# Patient Record
Sex: MALE | Race: WHITE
[De-identification: names, ages, dates, MRNs, and addresses within clinical notes are randomized per-mention and may not be internally consistent; named-entity substitution may affect disease eponyms.]

---

## 2018-11-20 ENCOUNTER — HOSPITAL ENCOUNTER (EMERGENCY)
Dept: HOSPITAL 62 - ER | Age: 63
LOS: 2 days | Discharge: PSYCHIATRIC OR SUBSTANCE USE FACILITY | End: 2018-11-22
Payer: MEDICARE

## 2018-11-20 DIAGNOSIS — I25.2: ICD-10-CM

## 2018-11-20 DIAGNOSIS — F43.10: ICD-10-CM

## 2018-11-20 DIAGNOSIS — Z90.49: ICD-10-CM

## 2018-11-20 DIAGNOSIS — Z95.1: ICD-10-CM

## 2018-11-20 DIAGNOSIS — F17.200: ICD-10-CM

## 2018-11-20 DIAGNOSIS — F99: Primary | ICD-10-CM

## 2018-11-20 LAB
ADD MANUAL DIFF: NO
ALBUMIN SERPL-MCNC: 4 G/DL (ref 3.5–5)
ALP SERPL-CCNC: 107 U/L (ref 38–126)
ALT SERPL-CCNC: 61 U/L (ref 21–72)
ANION GAP SERPL CALC-SCNC: 11 MMOL/L (ref 5–19)
APAP SERPL-MCNC: < 10 UG/ML (ref 10–30)
APPEARANCE UR: CLEAR
APTT PPP: YELLOW S
AST SERPL-CCNC: 63 U/L (ref 17–59)
BARBITURATES UR QL SCN: NEGATIVE
BASOPHILS # BLD AUTO: 0 10^3/UL (ref 0–0.2)
BASOPHILS NFR BLD AUTO: 0.4 % (ref 0–2)
BILIRUB DIRECT SERPL-MCNC: 0.4 MG/DL (ref 0–0.4)
BILIRUB SERPL-MCNC: 0.9 MG/DL (ref 0.2–1.3)
BILIRUB UR QL STRIP: NEGATIVE
BUN SERPL-MCNC: 10 MG/DL (ref 7–20)
CALCIUM: 9.3 MG/DL (ref 8.4–10.2)
CHLORIDE SERPL-SCNC: 98 MMOL/L (ref 98–107)
CO2 SERPL-SCNC: 28 MMOL/L (ref 22–30)
EOSINOPHIL # BLD AUTO: 0.1 10^3/UL (ref 0–0.6)
EOSINOPHIL NFR BLD AUTO: 0.7 % (ref 0–6)
ERYTHROCYTE [DISTWIDTH] IN BLOOD BY AUTOMATED COUNT: 14.7 % (ref 11.5–14)
ETHANOL SERPL-MCNC: < 10 MG/DL
GLUCOSE SERPL-MCNC: 119 MG/DL (ref 75–110)
GLUCOSE UR STRIP-MCNC: NEGATIVE MG/DL
HCT VFR BLD CALC: 49.8 % (ref 37.9–51)
HGB BLD-MCNC: 17.4 G/DL (ref 13.5–17)
KETONES UR STRIP-MCNC: (no result) MG/DL
LYMPHOCYTES # BLD AUTO: 2 10^3/UL (ref 0.5–4.7)
LYMPHOCYTES NFR BLD AUTO: 21.9 % (ref 13–45)
MCH RBC QN AUTO: 31.7 PG (ref 27–33.4)
MCHC RBC AUTO-ENTMCNC: 34.9 G/DL (ref 32–36)
MCV RBC AUTO: 91 FL (ref 80–97)
METHADONE UR QL SCN: NEGATIVE
MONOCYTES # BLD AUTO: 0.6 10^3/UL (ref 0.1–1.4)
MONOCYTES NFR BLD AUTO: 6.7 % (ref 3–13)
NEUTROPHILS # BLD AUTO: 6.5 10^3/UL (ref 1.7–8.2)
NEUTS SEG NFR BLD AUTO: 70.3 % (ref 42–78)
NITRITE UR QL STRIP: NEGATIVE
PCP UR QL SCN: NEGATIVE
PH UR STRIP: 6 [PH] (ref 5–9)
PLATELET # BLD: 216 10^3/UL (ref 150–450)
POTASSIUM SERPL-SCNC: 4.6 MMOL/L (ref 3.6–5)
PROT SERPL-MCNC: 6.8 G/DL (ref 6.3–8.2)
PROT UR STRIP-MCNC: NEGATIVE MG/DL
RBC # BLD AUTO: 5.49 10^6/UL (ref 4.35–5.55)
SALICYLATES SERPL-MCNC: < 1 MG/DL (ref 2–20)
SODIUM SERPL-SCNC: 137.2 MMOL/L (ref 137–145)
SP GR UR STRIP: 1.01
TOTAL CELLS COUNTED % (AUTO): 100 %
URINE AMPHETAMINES SCREEN: NEGATIVE
URINE BENZODIAZEPINES SCREEN: NEGATIVE
URINE COCAINE SCREEN: NEGATIVE
URINE MARIJUANA (THC) SCREEN: NEGATIVE
UROBILINOGEN UR-MCNC: NEGATIVE MG/DL (ref ?–2)
WBC # BLD AUTO: 9.2 10^3/UL (ref 4–10.5)

## 2018-11-20 PROCEDURE — 96372 THER/PROPH/DIAG INJ SC/IM: CPT

## 2018-11-20 PROCEDURE — 99285 EMERGENCY DEPT VISIT HI MDM: CPT

## 2018-11-20 PROCEDURE — 81001 URINALYSIS AUTO W/SCOPE: CPT

## 2018-11-20 PROCEDURE — 36415 COLL VENOUS BLD VENIPUNCTURE: CPT

## 2018-11-20 PROCEDURE — 80053 COMPREHEN METABOLIC PANEL: CPT

## 2018-11-20 PROCEDURE — 93005 ELECTROCARDIOGRAM TRACING: CPT

## 2018-11-20 PROCEDURE — 93971 EXTREMITY STUDY: CPT

## 2018-11-20 PROCEDURE — 80307 DRUG TEST PRSMV CHEM ANLYZR: CPT

## 2018-11-20 PROCEDURE — 93010 ELECTROCARDIOGRAM REPORT: CPT

## 2018-11-20 PROCEDURE — 85025 COMPLETE CBC W/AUTO DIFF WBC: CPT

## 2018-11-20 RX ADMIN — BUSPIRONE HYDROCHLORIDE SCH MG: 10 TABLET ORAL at 17:52

## 2018-11-20 RX ADMIN — OLANZAPINE SCH MG: 5 TABLET, FILM COATED ORAL at 17:52

## 2018-11-20 RX ADMIN — DIVALPROEX SODIUM SCH MG: 500 TABLET, FILM COATED, EXTENDED RELEASE ORAL at 17:52

## 2018-11-20 RX ADMIN — ASPIRIN SCH: 81 TABLET, CHEWABLE ORAL at 15:12

## 2018-11-20 NOTE — RADIOLOGY REPORT (SQ)
EXAM DESCRIPTION:  VENOUS UNILATERAL LOWER



COMPLETED DATE/TIME:  11/20/2018 6:20 pm



REASON FOR STUDY:  Swelling and pain right leg, donor site for CABG



COMPARISON:  None.



TECHNIQUE:  Dynamic and static gray scale and color images acquired of the right leg venous system. S
elected spectral images acquired with additional compression and augmentation maneuvers. The contrala
teral common femoral vein and saphenofemoral junction were also imaged. Images stored on PACS.



LIMITATIONS:  None.



FINDINGS:  COMMON FEMORAL: Normal phasicity, compression and augmentation. No visualized echogenic ma
terial on gray scale. No defects on color images.

FEMORAL: Normal compression and augmentation. No visualized echogenic material on gray scale. No defe
cts on color images.

POPLITEAL: Normal compression, augmentation. No visualized echogenic material on gray scale. No defec
ts on color images.

CALF VESSELS: Normal compression, augmentation. No visualized echogenic material on gray scale. No de
fects on color images.

GSV and SSV: The greater saphenous vein is surgically absent status post graft harvest.

ANY DEEP VENOUS INSUFFICIENCY: Not evaluated.

ANY EVIDENCE OF POPLITEAL CYST: No.

OTHER: No other significant finding.

CONTRALATERAL COMMON FEMORAL VEIN AND SAPHENOFEMORAL JUNCTION:

Normal phasicity, compression and augmentation. No visualized echogenic material on gray scale. No de
fects on color images.



IMPRESSION:  Negative ultrasound examination for deep venous thrombosis in the right lower extremity.




TECHNICAL DOCUMENTATION:  JOB ID:  [id number]

 2011 [employer]- All Rights Reserved



Reading location - IP/workstation name: [email or web address]
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
## 2018-11-20 NOTE — EKG REPORT
SEVERITY:- ABNORMAL ECG -

SINUS RHYTHM

LEFT ATRIAL ABNORMALITY

NONSPECIFIC INTRAVENTRICULAR CONDUCTION DELAY

INFERIOR INFARCT, AGE INDETERMINATE

:

Confirmed by: Kiarra Matta MD 20-Nov-2018 15:48:53

## 2018-11-21 RX ADMIN — OLANZAPINE SCH MG: 5 TABLET, FILM COATED ORAL at 10:44

## 2018-11-21 RX ADMIN — ASPIRIN SCH MG: 81 TABLET, CHEWABLE ORAL at 10:44

## 2018-11-21 RX ADMIN — BUSPIRONE HYDROCHLORIDE SCH MG: 10 TABLET ORAL at 18:55

## 2018-11-21 RX ADMIN — BUSPIRONE HYDROCHLORIDE SCH MG: 10 TABLET ORAL at 10:44

## 2018-11-21 RX ADMIN — DIVALPROEX SODIUM SCH MG: 500 TABLET, FILM COATED, EXTENDED RELEASE ORAL at 18:55

## 2018-11-21 RX ADMIN — OLANZAPINE SCH MG: 5 TABLET, FILM COATED ORAL at 18:55

## 2018-11-21 RX ADMIN — DIVALPROEX SODIUM SCH MG: 500 TABLET, FILM COATED, EXTENDED RELEASE ORAL at 10:44

## 2018-11-21 NOTE — ER DOCUMENT REPORT
Doctor's Note


Notes: 








Laboratory











  11/20/18 11/20/18 11/20/18





  11:26 11:26 11:26


 


WBC  9.2  


 


RBC  5.49  


 


Hgb  17.4 H  


 


Hct  49.8  


 


MCV  91  


 


MCH  31.7  


 


MCHC  34.9  


 


RDW  14.7 H  


 


Plt Count  216  


 


Seg Neutrophils %  70.3  


 


Lymphocytes %  21.9  


 


Monocytes %  6.7  


 


Eosinophils %  0.7  


 


Basophils %  0.4  


 


Absolute Neutrophils  6.5  


 


Absolute Lymphocytes  2.0  


 


Absolute Monocytes  0.6  


 


Absolute Eosinophils  0.1  


 


Absolute Basophils  0.0  


 


Sodium   137.2 


 


Potassium   4.6 


 


Chloride   98 


 


Carbon Dioxide   28 


 


Anion Gap   11 


 


BUN   10 


 


Creatinine   1.01 


 


Est GFR ( Amer)   > 60 


 


Est GFR (Non-Af Amer)   > 60 


 


Glucose   119 H 


 


Calcium   9.3 


 


Total Bilirubin   0.9 


 


Direct Bilirubin   0.4 


 


Neonat Total Bilirubin   Not Reportable 


 


Neonat Direct Bilirubin   Not Reportable 


 


Neonat Indirect Bili   Not Reportable 


 


AST   63 H 


 


ALT   61 


 


Alkaline Phosphatase   107 


 


Total Protein   6.8 


 


Albumin   4.0 


 


Urine Color    YELLOW


 


Urine Appearance    CLEAR


 


Urine pH    6.0


 


Ur Specific Gravity    1.008


 


Urine Protein    NEGATIVE


 


Urine Glucose (UA)    NEGATIVE


 


Urine Ketones    TRACE H


 


Urine Blood    NEGATIVE


 


Urine Nitrite    NEGATIVE


 


Urine Bilirubin    NEGATIVE


 


Urine Urobilinogen    NEGATIVE


 


Ur Leukocyte Esterase    NEGATIVE


 


Urine WBC (Auto)    2


 


Urine RBC (Auto)    0


 


Urine Bacteria (Auto)    TRACE


 


Urine Mucus (Auto)    RARE


 


Urine Ascorbic Acid    NEGATIVE


 


Salicylates   < 1.0 L 


 


Urine Opiates Screen   


 


Urine Methadone Screen   


 


Acetaminophen   < 10 L 


 


Ur Barbiturates Screen   


 


Ur Phencyclidine Scrn   


 


Ur Amphetamines Screen   


 


U Benzodiazepines Scrn   


 


Urine Cocaine Screen   


 


U Marijuana (THC) Screen   


 


Serum Alcohol   < 10 














  11/20/18





  11:26


 


WBC 


 


RBC 


 


Hgb 


 


Hct 


 


MCV 


 


MCH 


 


MCHC 


 


RDW 


 


Plt Count 


 


Seg Neutrophils % 


 


Lymphocytes % 


 


Monocytes % 


 


Eosinophils % 


 


Basophils % 


 


Absolute Neutrophils 


 


Absolute Lymphocytes 


 


Absolute Monocytes 


 


Absolute Eosinophils 


 


Absolute Basophils 


 


Sodium 


 


Potassium 


 


Chloride 


 


Carbon Dioxide 


 


Anion Gap 


 


BUN 


 


Creatinine 


 


Est GFR ( Amer) 


 


Est GFR (Non-Af Amer) 


 


Glucose 


 


Calcium 


 


Total Bilirubin 


 


Direct Bilirubin 


 


Neonat Total Bilirubin 


 


Neonat Direct Bilirubin 


 


Neonat Indirect Bili 


 


AST 


 


ALT 


 


Alkaline Phosphatase 


 


Total Protein 


 


Albumin 


 


Urine Color 


 


Urine Appearance 


 


Urine pH 


 


Ur Specific Gravity 


 


Urine Protein 


 


Urine Glucose (UA) 


 


Urine Ketones 


 


Urine Blood 


 


Urine Nitrite 


 


Urine Bilirubin 


 


Urine Urobilinogen 


 


Ur Leukocyte Esterase 


 


Urine WBC (Auto) 


 


Urine RBC (Auto) 


 


Urine Bacteria (Auto) 


 


Urine Mucus (Auto) 


 


Urine Ascorbic Acid 


 


Salicylates 


 


Urine Opiates Screen  NEGATIVE


 


Urine Methadone Screen  NEGATIVE


 


Acetaminophen 


 


Ur Barbiturates Screen  NEGATIVE


 


Ur Phencyclidine Scrn  NEGATIVE


 


Ur Amphetamines Screen  NEGATIVE


 


U Benzodiazepines Scrn  NEGATIVE


 


Urine Cocaine Screen  NEGATIVE


 


U Marijuana (THC) Screen  NEGATIVE


 


Serum Alcohol 











11/21/18 09:52


63-year-old male brought in and IVC paperwork for aggressive behavior.  As the 

rounding physician this AM, I assessed the patient's labs, vitals, and records. 

No concerning findings this morning. Patient denies any acute complaints.  

Patient is cleared for disposition by psychiatry.  At this time we are awaiting 

placement to a psychiatric facility.





PHYSICAL EXAMINATION:





GENERAL: Well-appearing, well-nourished and in no acute distress.





HEAD: Atraumatic, normocephalic.





EYES: Pupils equal round extraocular movements intact,  conjunctiva are normal.





ENT: Nares patent





NECK: Normal range of motion





LUNGS: No respiratory distress





Musculoskeletal: Normal range of motion





NEUROLOGICAL:  Normal speech, normal gait. 





PSYCH: Normal mood, normal affect.





SKIN: Warm, Dry, normal turgor, no rashes or lesions noted.

## 2018-11-21 NOTE — PSYCHOLOGICAL NOTE
Psych Note





- Psych Note


Date seen by psych provider: 11/21/18


Time seen by psych provider: 12:00


Psych Note: 


Reason for Consult: IVC


Consent permissions: fuad Maciel to be daughter in law, [phone]





Patient is here under IVC papers due to aggressive behavior and hostile actions.





Check in conduct with patient


Patient presents irritable and states he has no interest in talking to 

clinician.  Patient had previously requested to call his , his friend, 

[facility] because he thought he was under arrest and being held under a warrant. 

Patient appears to be confusing IVC to being under arrest; however, clinician 

id unable to clarify the situation because he is refusing to allow clinician to 

talk to him. Patient was noted to have a good evening and had no difficulties.  

Patient requests his soon-to-be daughter-in-law, Janell, to be contacted to 

come and sit with him.





Clinician contacted daughter-in-law Janell who reports she will be there as 

soon as possible.





Medication recommendations per [facility]'s contracted Dr. Evie BRITO are as follows


Depakote 500 mg twice daily 


BuSpar 10 mg twice daily 


Zyprexa 5 mg twice daily 


Cogentin 1 mg daily





309.81 (F43.10) posttraumatic stress disorder





Impression/plan:Patient is recommended to continue under IVC.  Patient has 

labile mood with tearful affect.  Patient reports frequent flashbacks; the last 

one being just last night.  He thought he was still on duty in [facility]; this 

resulted in the patient becoming aggressive with family.  He confirms he has 

never been aggressive before.  Medication recommendations have been provided.  

Patient will be re-evaluated. Dr. Sidhu was consulted on the care and 

management of this patient; attending physician is in agreement with 

recommendations and disposition.

## 2018-11-22 VITALS — DIASTOLIC BLOOD PRESSURE: 79 MMHG | SYSTOLIC BLOOD PRESSURE: 166 MMHG

## 2018-11-22 NOTE — PSYCHOLOGICAL NOTE
Psych Note





- Psych Note


Date seen by psych provider: 11/22/18


Time seen by psych provider: 08:00


Psych Note: 





Reason for Consult: IVC


Consent permissions: fuad Maciel to be daughter in law, [phone]





Patient is here under IVC papers due to aggressive behavior and hostile actions.





Check in conduct with patient


Patient was accepted to [place] and transportation was set up for this 

morning.  Clinician explained placement has been found to [place] to 

patient.  He reports his understanding and received [place] brochures.  

Patient states he has no further concerns.  Clinician notes patient never makes 

eye contact.





Medication recommendations per [facility]'s contracted Dr. Evie BRITO are as follows


Depakote 500 mg twice daily 


BuSpar 10 mg twice daily 


Zyprexa 5 mg twice daily 


Cogentin 1 mg daily





309.81 (F43.10) posttraumatic stress disorder





Impression/plan:Patient is recommended to continue under IVC.  Patient was 

accepted to [place]; transportation was set up for this morning.  Patient 

reports no further concerns at this time.  Dr. Sidhu was consulted on the 

care and management of this patient; attending physician is in agreement with 

recommendations and disposition.

## 2018-11-22 NOTE — ER DOCUMENT REPORT
Doctor's Note


Notes: 





11/22/18 07:55


Patient has been accepted at [facility].  Law enforcement 

will be here shortly to transport him.  At this time he is medically stable for 

transport.

## 2018-12-13 ENCOUNTER — HOSPITAL ENCOUNTER (EMERGENCY)
Dept: HOSPITAL 62 - ER | Age: 63
LOS: 2 days | Discharge: PSYCHIATRIC OR SUBSTANCE USE FACILITY | End: 2018-12-15
Payer: OTHER GOVERNMENT

## 2018-12-13 DIAGNOSIS — F17.210: ICD-10-CM

## 2018-12-13 DIAGNOSIS — Z95.1: ICD-10-CM

## 2018-12-13 DIAGNOSIS — F43.10: Primary | ICD-10-CM

## 2018-12-13 DIAGNOSIS — Z79.899: ICD-10-CM

## 2018-12-13 DIAGNOSIS — I25.2: ICD-10-CM

## 2018-12-13 DIAGNOSIS — R45.6: ICD-10-CM

## 2018-12-13 DIAGNOSIS — Z79.82: ICD-10-CM

## 2018-12-13 DIAGNOSIS — Z88.8: ICD-10-CM

## 2018-12-13 LAB
ADD MANUAL DIFF: NO
ALBUMIN SERPL-MCNC: 3.7 G/DL (ref 3.5–5)
ALP SERPL-CCNC: 92 U/L (ref 38–126)
ALT SERPL-CCNC: 33 U/L (ref 21–72)
ANION GAP SERPL CALC-SCNC: 11 MMOL/L (ref 5–19)
APAP SERPL-MCNC: < 10 UG/ML (ref 10–30)
APPEARANCE UR: CLEAR
APTT PPP: COLORLESS S
AST SERPL-CCNC: 33 U/L (ref 17–59)
BARBITURATES UR QL SCN: NEGATIVE
BASOPHILS # BLD AUTO: 0.1 10^3/UL (ref 0–0.2)
BASOPHILS NFR BLD AUTO: 0.6 % (ref 0–2)
BILIRUB DIRECT SERPL-MCNC: 0.2 MG/DL (ref 0–0.4)
BILIRUB SERPL-MCNC: 0.4 MG/DL (ref 0.2–1.3)
BILIRUB UR QL STRIP: NEGATIVE
BUN SERPL-MCNC: 9 MG/DL (ref 7–20)
CALCIUM: 9.2 MG/DL (ref 8.4–10.2)
CHLORIDE SERPL-SCNC: 98 MMOL/L (ref 98–107)
CO2 SERPL-SCNC: 28 MMOL/L (ref 22–30)
EOSINOPHIL # BLD AUTO: 0.1 10^3/UL (ref 0–0.6)
EOSINOPHIL NFR BLD AUTO: 1.7 % (ref 0–6)
ERYTHROCYTE [DISTWIDTH] IN BLOOD BY AUTOMATED COUNT: 14.4 % (ref 11.5–14)
ETHANOL SERPL-MCNC: < 10 MG/DL
GLUCOSE SERPL-MCNC: 106 MG/DL (ref 75–110)
GLUCOSE UR STRIP-MCNC: NEGATIVE MG/DL
HCT VFR BLD CALC: 43.9 % (ref 37.9–51)
HGB BLD-MCNC: 15.2 G/DL (ref 13.5–17)
KETONES UR STRIP-MCNC: NEGATIVE MG/DL
LYMPHOCYTES # BLD AUTO: 2.3 10^3/UL (ref 0.5–4.7)
LYMPHOCYTES NFR BLD AUTO: 28.3 % (ref 13–45)
MCH RBC QN AUTO: 31.2 PG (ref 27–33.4)
MCHC RBC AUTO-ENTMCNC: 34.6 G/DL (ref 32–36)
MCV RBC AUTO: 90 FL (ref 80–97)
METHADONE UR QL SCN: NEGATIVE
MONOCYTES # BLD AUTO: 0.6 10^3/UL (ref 0.1–1.4)
MONOCYTES NFR BLD AUTO: 7.5 % (ref 3–13)
NEUTROPHILS # BLD AUTO: 5.1 10^3/UL (ref 1.7–8.2)
NEUTS SEG NFR BLD AUTO: 61.9 % (ref 42–78)
NITRITE UR QL STRIP: NEGATIVE
PCP UR QL SCN: NEGATIVE
PH UR STRIP: 7 [PH] (ref 5–9)
PLATELET # BLD: 226 10^3/UL (ref 150–450)
POTASSIUM SERPL-SCNC: 4.3 MMOL/L (ref 3.6–5)
PROT SERPL-MCNC: 6.2 G/DL (ref 6.3–8.2)
PROT UR STRIP-MCNC: NEGATIVE MG/DL
RBC # BLD AUTO: 4.88 10^6/UL (ref 4.35–5.55)
SALICYLATES SERPL-MCNC: < 1 MG/DL (ref 2–20)
SODIUM SERPL-SCNC: 136.8 MMOL/L (ref 137–145)
SP GR UR STRIP: 1
TOTAL CELLS COUNTED % (AUTO): 100 %
URINE AMPHETAMINES SCREEN: NEGATIVE
URINE BENZODIAZEPINES SCREEN: NEGATIVE
URINE COCAINE SCREEN: NEGATIVE
URINE MARIJUANA (THC) SCREEN: NEGATIVE
UROBILINOGEN UR-MCNC: NEGATIVE MG/DL (ref ?–2)
WBC # BLD AUTO: 8.3 10^3/UL (ref 4–10.5)

## 2018-12-13 PROCEDURE — 80053 COMPREHEN METABOLIC PANEL: CPT

## 2018-12-13 PROCEDURE — 99285 EMERGENCY DEPT VISIT HI MDM: CPT

## 2018-12-13 PROCEDURE — 85025 COMPLETE CBC W/AUTO DIFF WBC: CPT

## 2018-12-13 PROCEDURE — 71046 X-RAY EXAM CHEST 2 VIEWS: CPT

## 2018-12-13 PROCEDURE — 93005 ELECTROCARDIOGRAM TRACING: CPT

## 2018-12-13 PROCEDURE — 81001 URINALYSIS AUTO W/SCOPE: CPT

## 2018-12-13 PROCEDURE — 80307 DRUG TEST PRSMV CHEM ANLYZR: CPT

## 2018-12-13 PROCEDURE — 36415 COLL VENOUS BLD VENIPUNCTURE: CPT

## 2018-12-13 PROCEDURE — 93010 ELECTROCARDIOGRAM REPORT: CPT

## 2018-12-13 NOTE — RADIOLOGY REPORT (SQ)
EXAM DESCRIPTION:  CHEST 2 VIEWS



COMPLETED DATE/TIME:  12/13/2018 8:02 pm



REASON FOR STUDY:  COUGH



COMPARISON:  Chest films 4/12/2013



EXAM PARAMETERS:  NUMBER OF VIEWS: two views

TECHNIQUE: Digital Frontal and Lateral radiographic views of the chest acquired.

RADIATION DOSE: NA

LIMITATIONS: none



FINDINGS:  LUNGS AND PLEURA: No opacities, masses or pneumothorax. No pleural effusion.

MEDIASTINUM AND HILAR STRUCTURES: No masses or contour abnormalities.

HEART AND VASCULAR STRUCTURES: No cardiomegaly.  Old sternotomy for CABG

BONES: No acute findings.

HARDWARE: Left-sided dual lead pacemaker

OTHER: No other significant finding.



IMPRESSION:  NO ACUTE RADIOGRAPHIC FINDING IN THE CHEST.



TECHNICAL DOCUMENTATION:  JOB ID:  [id number]

 2011 [employer]- All Rights Reserved



Reading location - IP/workstation name: [email or web address]
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
## 2018-12-13 NOTE — EKG REPORT
SEVERITY:- ABNORMAL ECG -

SINUS RHYTHM

PROBABLE LEFT ATRIAL ABNORMALITY

NONSPECIFIC INTRAVENTRICULAR CONDUCTION DELAY

INFERIOR INFARCT, OLD

:

Confirmed by: Kiarra Matta MD 13-Dec-2018 21:29:11

## 2018-12-14 RX ADMIN — OLANZAPINE SCH MG: 5 TABLET, FILM COATED ORAL at 10:54

## 2018-12-14 RX ADMIN — BUSPIRONE HYDROCHLORIDE SCH MG: 10 TABLET ORAL at 10:54

## 2018-12-14 RX ADMIN — DIVALPROEX SODIUM SCH MG: 500 TABLET, FILM COATED, EXTENDED RELEASE ORAL at 10:54

## 2018-12-14 RX ADMIN — OLANZAPINE SCH MG: 5 TABLET, FILM COATED ORAL at 17:33

## 2018-12-14 RX ADMIN — BUSPIRONE HYDROCHLORIDE SCH MG: 10 TABLET ORAL at 17:32

## 2018-12-14 RX ADMIN — DIVALPROEX SODIUM SCH MG: 500 TABLET, FILM COATED, EXTENDED RELEASE ORAL at 17:32
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
## 2018-12-14 NOTE — RADIOLOGY REPORT (SQ)
EXAM DESCRIPTION:  CT HEAD WITHOUT



COMPLETED DATE/TIME:  12/14/2018 10:31 am



REASON FOR STUDY:  49, change in mental status



COMPARISON:  None.



TECHNIQUE:  Axial images acquired through the brain without intravenous contrast.  Images reviewed wi
th bone, brain and subdural windows.  Additional sagittal and coronal reconstructions were generated.
 Images stored on PACS.

All CT scanners at this facility use dose modulation, iterative reconstruction, and/or weight based d
osing when appropriate to reduce radiation dose to as low as reasonably achievable (ALARA).

[facility]: Dose Right  [facility]: CareDose    [facility]: Dose Right    [facility]: Teradose 4D    [facility]: Smart CaseStack



RADIATION DOSE:  CT Rad equipment meets quality standard of care and radiation dose reduction techniq
ues were employed. CTDIvol: 53.2 mGy. DLP: 1070 mGy-cm. mGy.



LIMITATIONS:  None.



FINDINGS:  VENTRICLES: Normal size and contour.

CEREBRUM: No masses.  No hemorrhage.  No midline shift.  No evidence for acute infarction. Normal gra
y/white matter differentiation. No areas of low density in the white matter.

CEREBELLUM: No masses.  No hemorrhage.  No alteration of density.  No evidence for acute infarction.

EXTRAAXIAL SPACES: No fluid collections.  No masses.

ORBITS AND GLOBE: No intra- or extraconal masses.  Normal contour of globe without masses.

CALVARIUM: No fracture.

PARANASAL SINUSES: No fluid or mucosal thickening.

SOFT TISSUES: No mass or hematoma.

OTHER: No other significant finding.



IMPRESSION:  No acute intracranial pathology.

EVIDENCE OF ACUTE STROKE: NO.



COMMENT:  Quality ID # 436: Final reports with documentation of one or more dose reduction techniques
 (e.g., Automated exposure control, adjustment of the mA and/or kV according to patient size, use of 
iterative reconstruction technique)



TECHNICAL DOCUMENTATION:  JOB ID:  [id number]

 2011 [employer]- All Rights Reserved



Reading location - IP/workstation name: [email or web address]

## 2018-12-15 VITALS — DIASTOLIC BLOOD PRESSURE: 68 MMHG | SYSTOLIC BLOOD PRESSURE: 125 MMHG

## 2018-12-15 RX ADMIN — BUSPIRONE HYDROCHLORIDE SCH MG: 10 TABLET ORAL at 09:37

## 2018-12-15 RX ADMIN — OLANZAPINE SCH MG: 5 TABLET, FILM COATED ORAL at 09:37

## 2018-12-15 RX ADMIN — DIVALPROEX SODIUM SCH MG: 500 TABLET, FILM COATED, EXTENDED RELEASE ORAL at 09:37

## 2018-12-15 NOTE — ER DOCUMENT REPORT
Doctor's Note


Notes: 





12/15/18 09:29


63-year-old male who [facility]  Vital signs are 

stable.  Labs previously as recorded.  CT scan of the head yesterday showed no 

acute infarcts.  Patient still has no focal neurological deficits.

## 2020-03-08 ENCOUNTER — HOSPITAL ENCOUNTER (INPATIENT)
Dept: HOSPITAL 62 - ER | Age: 65
Discharge: ANOTHER HOSPITAL | DRG: 872 | End: 2020-03-08
Attending: INTERNAL MEDICINE | Admitting: INTERNAL MEDICINE
Payer: OTHER GOVERNMENT

## 2020-03-08 VITALS — DIASTOLIC BLOOD PRESSURE: 65 MMHG | SYSTOLIC BLOOD PRESSURE: 113 MMHG

## 2020-03-08 DIAGNOSIS — I25.2: ICD-10-CM

## 2020-03-08 DIAGNOSIS — Z79.82: ICD-10-CM

## 2020-03-08 DIAGNOSIS — Z95.810: ICD-10-CM

## 2020-03-08 DIAGNOSIS — Z88.8: ICD-10-CM

## 2020-03-08 DIAGNOSIS — Z87.891: ICD-10-CM

## 2020-03-08 DIAGNOSIS — I11.0: ICD-10-CM

## 2020-03-08 DIAGNOSIS — E66.9: ICD-10-CM

## 2020-03-08 DIAGNOSIS — I42.0: ICD-10-CM

## 2020-03-08 DIAGNOSIS — Z95.1: ICD-10-CM

## 2020-03-08 DIAGNOSIS — Z91.048: ICD-10-CM

## 2020-03-08 DIAGNOSIS — A41.9: Primary | ICD-10-CM

## 2020-03-08 DIAGNOSIS — I25.10: ICD-10-CM

## 2020-03-08 DIAGNOSIS — Z95.0: ICD-10-CM

## 2020-03-08 DIAGNOSIS — N39.0: ICD-10-CM

## 2020-03-08 DIAGNOSIS — E78.5: ICD-10-CM

## 2020-03-08 DIAGNOSIS — I50.20: ICD-10-CM

## 2020-03-08 DIAGNOSIS — I47.2: ICD-10-CM

## 2020-03-08 LAB
ADD MANUAL DIFF: NO
ALBUMIN SERPL-MCNC: 3.6 G/DL (ref 3.5–5)
ALP SERPL-CCNC: 154 U/L (ref 38–126)
ANION GAP SERPL CALC-SCNC: 10 MMOL/L (ref 5–19)
APPEARANCE UR: (no result)
APTT PPP: (no result) S
AST SERPL-CCNC: 28 U/L (ref 17–59)
BASE EXCESS BLDV CALC-SCNC: -4.3 MMOL/L
BASOPHILS # BLD AUTO: 0 10^3/UL (ref 0–0.2)
BASOPHILS NFR BLD AUTO: 0.3 % (ref 0–2)
BILIRUB DIRECT SERPL-MCNC: 0.2 MG/DL (ref 0–0.4)
BILIRUB SERPL-MCNC: 0.9 MG/DL (ref 0.2–1.3)
BILIRUB UR QL STRIP: NEGATIVE
BUN SERPL-MCNC: 19 MG/DL (ref 7–20)
CALCIUM: 9 MG/DL (ref 8.4–10.2)
CHLORIDE SERPL-SCNC: 100 MMOL/L (ref 98–107)
CO2 SERPL-SCNC: 23 MMOL/L (ref 22–30)
EOSINOPHIL # BLD AUTO: 0 10^3/UL (ref 0–0.6)
EOSINOPHIL NFR BLD AUTO: 0.4 % (ref 0–6)
ERYTHROCYTE [DISTWIDTH] IN BLOOD BY AUTOMATED COUNT: 16.2 % (ref 11.5–14)
GLUCOSE SERPL-MCNC: 134 MG/DL (ref 75–110)
GLUCOSE UR STRIP-MCNC: NEGATIVE MG/DL
HCO3 BLDV-SCNC: 18.2 MMOL/L (ref 20–32)
HCT VFR BLD CALC: 45.6 % (ref 37.9–51)
HGB BLD-MCNC: 15.4 G/DL (ref 13.5–17)
KETONES UR STRIP-MCNC: NEGATIVE MG/DL
LYMPHOCYTES # BLD AUTO: 1.9 10^3/UL (ref 0.5–4.7)
LYMPHOCYTES NFR BLD AUTO: 16.5 % (ref 13–45)
MCH RBC QN AUTO: 31.3 PG (ref 27–33.4)
MCHC RBC AUTO-ENTMCNC: 33.8 G/DL (ref 32–36)
MCV RBC AUTO: 93 FL (ref 80–97)
MONOCYTES # BLD AUTO: 0.6 10^3/UL (ref 0.1–1.4)
MONOCYTES NFR BLD AUTO: 4.9 % (ref 3–13)
NEUTROPHILS # BLD AUTO: 9.1 10^3/UL (ref 1.7–8.2)
NEUTS SEG NFR BLD AUTO: 77.9 % (ref 42–78)
NITRITE UR QL STRIP: NEGATIVE
PCO2 BLDV: 26.9 MMHG (ref 35–63)
PH BLDV: 7.45 [PH] (ref 7.3–7.42)
PH UR STRIP: 5 [PH] (ref 5–9)
PLATELET # BLD: 166 10^3/UL (ref 150–450)
POTASSIUM SERPL-SCNC: 5.2 MMOL/L (ref 3.6–5)
PROT SERPL-MCNC: 6.8 G/DL (ref 6.3–8.2)
PROT UR STRIP-MCNC: 100 MG/DL
RBC # BLD AUTO: 4.92 10^6/UL (ref 4.35–5.55)
SP GR UR STRIP: 1.01
TOTAL CELLS COUNTED % (AUTO): 100 %
UROBILINOGEN UR-MCNC: NEGATIVE MG/DL (ref ?–2)
WBC # BLD AUTO: 11.6 10^3/UL (ref 4–10.5)

## 2020-03-08 PROCEDURE — 87040 BLOOD CULTURE FOR BACTERIA: CPT

## 2020-03-08 PROCEDURE — 83880 ASSAY OF NATRIURETIC PEPTIDE: CPT

## 2020-03-08 PROCEDURE — 85379 FIBRIN DEGRADATION QUANT: CPT

## 2020-03-08 PROCEDURE — 83605 ASSAY OF LACTIC ACID: CPT

## 2020-03-08 PROCEDURE — 71275 CT ANGIOGRAPHY CHEST: CPT

## 2020-03-08 PROCEDURE — 71045 X-RAY EXAM CHEST 1 VIEW: CPT

## 2020-03-08 PROCEDURE — 93005 ELECTROCARDIOGRAM TRACING: CPT

## 2020-03-08 PROCEDURE — 85025 COMPLETE CBC W/AUTO DIFF WBC: CPT

## 2020-03-08 PROCEDURE — 84484 ASSAY OF TROPONIN QUANT: CPT

## 2020-03-08 PROCEDURE — 96365 THER/PROPH/DIAG IV INF INIT: CPT

## 2020-03-08 PROCEDURE — 99285 EMERGENCY DEPT VISIT HI MDM: CPT

## 2020-03-08 PROCEDURE — 82803 BLOOD GASES ANY COMBINATION: CPT

## 2020-03-08 PROCEDURE — 80053 COMPREHEN METABOLIC PANEL: CPT

## 2020-03-08 PROCEDURE — 81001 URINALYSIS AUTO W/SCOPE: CPT

## 2020-03-08 PROCEDURE — 36415 COLL VENOUS BLD VENIPUNCTURE: CPT

## 2020-03-08 PROCEDURE — 93010 ELECTROCARDIOGRAM REPORT: CPT

## 2020-03-08 NOTE — EKG REPORT
SEVERITY:- ABNORMAL ECG -

SINUS TACHYCARDIA

IVCD, CONSIDER ATYPICAL LBBB

INFERIOR INFARCT, ACUTE

:

Confirmed by: Cooper Lin MD 08-Mar-2020 16:19:35

## 2020-03-08 NOTE — PDOC TRANSFER SUMMARY
General


Admission Date/PCP: 


  03/08/20 08:57





  JOHN OBANDO MD





Transfer Date: 03/08/20


Accepting Facility: [place]


Accepting Physician: Dr. Carmen Dickinson


Resuscitation Status: Full Code





- Transfer Diagnosis


(1) Sepsis


Is this a current diagnosis for this admission?: Yes   





(2) CAD (coronary artery disease)


Is this a current diagnosis for this admission?: Yes   





(3) AICD (automatic cardioverter/defibrillator) present


Is this a current diagnosis for this admission?: Yes   





(4) UTI (urinary tract infection)


Is this a current diagnosis for this admission?: Yes   





(5) Ventricular tachycardia


Is this a current diagnosis for this admission?: Yes   





- Transfer Medications


Home Medications: 


                                        





Aspirin [Ecotrin] 81 mg PO DAILY 11/20/18 


Aripiprazole [Abilify] 5 mg PO DAILY 12/13/18 


Furosemide [Lasix] 5 mg PO DAILY 12/13/18 


Indomethacin [Indocin 25 mg Capsule] 25 mg PO PRN PRN 12/13/18 


Melatonin [Melatonin 3 mg Tablet] 3 mg PO QHS 12/13/18 


Tadalafil [Cialis] 10 mg PO DAILY 12/13/18 








Transfer Medications: 


                               Current Medications





Acetaminophen (Tylenol 325 Mg Tablet)  650 mg PO Q4HP PRN


   PRN Reason: FOR PAIN SCALE 1-3


   Stop: 04/07/20 08:17


Albuterol/Ipratropium (Duoneb 3 Ml Ampul)  3 ml NEB RTQ6HP PRN


   PRN Reason: SHORTNESS OF BREATH


   Stop: 04/07/20 08:17


Docusate Sodium (Colace 100 Mg Capsule)  100 mg PO DAILY [facility]


   Stop: 04/07/20 09:59


   Last Admin: 03/08/20 12:24 Dose:  Not Given


   Documented by: 


Enoxaparin Sodium (Lovenox Inj 40 Mg/0.4 Ml Disp.Syrin)  40 mg SUBCUT DAILY [facility]


   Stop: 04/07/20 11:59


Ceftriaxone Sodium/Dextrose (Rocephin Rtu 2 Gm/D5w 50 Ml Premix Bag)  2 gm in 50

mls @ 100 mls/hr IV DAILY [facility]


   Stop: 03/16/20 09:59


Amiodarone HCl 900 mg/ (Dextrose)  500 mls @ 0 mls/hr IV CONTINUOUS PRN; 

Protocol


   PRN Reason: THIS MED IS NOT "PRN"


   Stop: 03/11/20 12:34


Mexiletine HCl (Mexitil 200 Mg Capsule)  200 mg PO NOW ONE


   Stop: 03/08/20 13:23


Ondansetron HCl (Zofran Odt 4 Mg Tablet)  4 mg PO Q4HP PRN


   PRN Reason: FOR NAUSEA/VOMITING


   Stop: 04/07/20 08:17


Oxycodone/Acetaminophen (Percocet 5-325 Mg Tablet)  1 tab PO Q6HP PRN


   PRN Reason: FOR PAIN SCALE 4-5


   Stop: 03/15/20 08:17


Pantoprazole Sodium (Protonix 20 Mg Dr Tablet)  20 mg PO Q6AM [facility]


   Stop: 04/08/20 05:59











- Allergies


Allergies/Adverse Reactions: 


                                        





haloperidol [From Haldol] Allergy (Verified 12/13/18 15:56)


   


adhesive tape Adverse Reaction (Verified 12/13/18 15:56)


   











- Diet/Activity


Discharge Diet: Cardiac


Discharge Activity: Bedrest





Hospital Course


Hospital Course: 





Hospital Course: 


Patient was seen in the emergency room earlier today with UTI and sepsis.  He 

was noted to have wide-complex tachycardia on EKG.  As such a cardiology consult

was obtained.  Please refer to cardiology consultation for full details.  After 

evaluation by cardiology patient was confirmed to be in ventricular tachycardia.

 He just had an ablation done on February 11 and he was supposed to report back 

at the end of March for ICD pulse generator change out.  His ICD was 

interrogated and patient was found to have an underlying ventricular tachycardia

which was ultimately paced terminated by the cardiologist.  Dr. Treviño performed

a programmed electrical stimulation with burst pacing.  Due to this patient's 

complex medical issue and need for device pulse generator change it is felt that

patient should be transferred to a tertiary care center, preferably [facility] can be 

performed in a more urgent fashion given his presentation with ventricular 

tachycardia.  Both Dr. Treviño and I spoke with the cardiologist fellow at [facility].  

They have accepted this patient although he will be going to the ED from where 

he will be evaluated by cardiology.  I talked to Dr Tripathi, the cardiology 

fellow.  Dr. Carmen Dickinson will be the accepting physician


Patient had monomorphic ventricular tachycardia status post termination by 

programmed external stimulation.  He was also started on intravenous amiodarone 

bolus and maintenance and will be started on mexiletine as suggested by [facility] 

cardiology








Physical Exam


Vital Signs: 


                                        











Temp Pulse Resp BP Pulse Ox


 


 98 F      17   114/84   96 


 


 03/08/20 11:00     03/08/20 13:01  03/08/20 13:01  03/08/20 13:01








                                 Intake & Output











 03/07/20 03/08/20 03/09/20





 05:59 06:59 06:59


 


Output Total   400


 


Balance   -400


 


Weight   











General appearance: PRESENT: no acute distress


Respiratory exam: PRESENT: clear to auscultation dimitry


Cardiovascular exam: PRESENT: RRR, +S1, +S2


GI/Abdominal exam: PRESENT: soft.  ABSENT: tenderness


Rectal exam: PRESENT: deferred


Extremities exam: PRESENT: +1 edema.  ABSENT: calf tenderness


Neurological exam: PRESENT: alert, awake, oriented to person, oriented to place,

oriented to time, oriented to situation





Results


Laboratory Results: 


                                        





                                 03/08/20 00:15 





                                 03/08/20 00:15 





                                        











  03/08/20 03/08/20 03/08/20





  00:15 00:15 00:15


 


WBC  11.6 H  


 


RBC  4.92  


 


Hgb  15.4  


 


Hct  45.6  


 


MCV  93  


 


MCH  31.3  


 


MCHC  33.8  


 


RDW  16.2 H  


 


Plt Count  166  


 


Seg Neutrophils %  77.9  


 


VBG pH   


 


VBG pCO2   


 


VBG HCO3   


 


VBG Base Excess   


 


Sodium   133.0 L 


 


Potassium   5.2 H 


 


Chloride   100 


 


Carbon Dioxide   23 


 


Anion Gap   10 


 


BUN   19 


 


Creatinine   1.50 H 


 


Est GFR ( Amer)   57 L 


 


Glucose   134 H 


 


Lactic Acid    1.4


 


Calcium   9.0 


 


Total Bilirubin   0.9 


 


AST   28 


 


Alkaline Phosphatase   154 H 


 


Total Protein   6.8 


 


Albumin   3.6 


 


Urine Color   


 


Urine Appearance   


 


Urine pH   


 


Ur Specific Gravity   


 


Urine Protein   


 


Urine Glucose (UA)   


 


Urine Ketones   


 


Urine Blood   


 


Urine Nitrite   


 


Ur Leukocyte Esterase   


 


Urine WBC (Auto)   


 


Urine RBC (Auto)   














  03/08/20 03/08/20





  01:05 03:10


 


WBC  


 


RBC  


 


Hgb  


 


Hct  


 


MCV  


 


MCH  


 


MCHC  


 


RDW  


 


Plt Count  


 


Seg Neutrophils %  


 


VBG pH   7.45 H


 


VBG pCO2   26.9 L


 


VBG HCO3   18.2 L


 


VBG Base Excess   -4.3


 


Sodium  


 


Potassium  


 


Chloride  


 


Carbon Dioxide  


 


Anion Gap  


 


BUN  


 


Creatinine  


 


Est GFR (African Amer)  


 


Glucose  


 


Lactic Acid  


 


Calcium  


 


Total Bilirubin  


 


AST  


 


Alkaline Phosphatase  


 


Total Protein  


 


Albumin  


 


Urine Color  DARK YELLOW 


 


Urine Appearance  CLOUDY 


 


Urine pH  5.0 


 


Ur Specific Gravity  1.015 


 


Urine Protein  100 H 


 


Urine Glucose (UA)  NEGATIVE 


 


Urine Ketones  NEGATIVE 


 


Urine Blood  SMALL H 


 


Urine Nitrite  NEGATIVE 


 


Ur Leukocyte Esterase  LARGE H 


 


Urine WBC (Auto)  >182 


 


Urine RBC (Auto)  11 








                                        











  03/08/20 03/08/20 03/08/20





  00:15 00:15 09:53


 


Troponin I  < 0.012   < 0.012


 


NT-Pro-B Natriuret Pep   5400 H 











EKG Comments: 





Wide complex Tachycardia


Impressions: 


                                        





Chest X-Ray  03/08/20 00:28


IMPRESSION:


 


Cardiomegaly. No acute cardiopulmonary process


 


 


copyright 2011 [employer]- All Rights Reserved


 








Chest/Abdomen CTA  03/08/20 01:53


IMPRESSION: 


 


1. No pulmonary embolus.


 


2. Cardiomegaly with coronary artery atherosclerosis. Minimal


interstitial opacities and reflux of contrast into the hepatic


veins. These findings could be seen with right heart dysfunction


and mild interstitial pulmonary edema.


 


3. Small bilateral pleural effusions and bibasilar compressive


atelectasis.


 


4. Prior median sternotomy with mild separation of the sternotomy


fragments measuring 1.9 cm with increased number of sternotomy


wire fractures. No definite soft tissue abnormalities along the


median sternotomy.


 


5. Findings suggest chronic pancreatitis.


 


6. Mild mediastinal lymphadenopathy. This may be reactive or


congestive. If the patient has history of neoplasm uma


metastatic disease could produce a similar appearance.


 


This exam was performed according to our departmental


dose-optimization program, which includes automated exposure


control, adjustment of the mA and/or kV according to patient size


and/or use of iterative reconstruction technique.


 














Plan


Discharge Plan: 





Transfer to [facility]


Time Spent: Greater than 30 Minutes

## 2020-03-08 NOTE — EKG REPORT
SEVERITY:- ABNORMAL ECG -

SINUS RHYTHM

NONSPECIFIC INTRAVENTRICULAR CONDUCTION DELAY

MINIMAL ST DEPRESSION, LATERAL LEADS

AGE UNDETERMINED INFERIOR MI.

IVCD, LA ABNORMALITY.

:

Confirmed by: Cooper Lin MD 08-Mar-2020 16:18:55

## 2020-03-08 NOTE — RADIOLOGY REPORT (SQ)
EXAM DESCRIPTION: 



CT CHEST ANGIOGRAPHY WITHOUT THEN WITH IV CONTRAST



COMPLETED DATE/TME:  03/08/2020 01:53



CLINICAL HISTORY: Dyspnea, tachycardia



COMPARISON: 12/13/2018



TECHNIQUE: CTA of the chest obtained following the uncomplicated

intravenous administration of . 3-D/MIP reformatted images of the

chest available for evaluation.





FINDINGS: 



Chest:

Pulmonary arteries: Contrast bolus is adequate.No filling defects

identified in the pulmonary arteries to suggest pulmonary

embolus.

Thyroid:No abnormalities of the visualized thyroid.

Great Vessels:Great vessels have normal anatomic configuration.

Thoracic Aorta: Atherosclerotic calcification of the thoracic

aorta.

Heart: Coronary artery atherosclerosis. Cardiomegaly or

significant pericardial effusion. Left-sided pacemaker. Prior

median sternotomy. Progressive separation of the sternum

measuring approximately 1.9 cm with increased number of

sternotomy wire fractures. No definite soft tissue abnormalities

of the sternotomy. Reflux of contrast into the hepatic veins.

Lymph Nodes: Multiple enlarged paratracheal lymph nodes.

Esophagus:No abnormalities of the esophagus identified.

Other:No additional findings.



Lungs: Mild bibasilar compressive atelectasis. Right subpleural

lymph node. Minimal peripheral interstitial opacities.

Pleura: Small bilateral pleural effusions.

Trachea/Airways: No acute abnormality of the trachea.



Bones: Degenerative change of the spine. The



Upper Abdomen:Limited images of the upper abdomen demonstrate no

definite abnormalities of visualized portions of the liver, 

spleen, adrenal glands, or kidneys. Coarse pancreatic

calcifications may indicate chronic pancreatitis. Prior

cholecystectomy.







IMPRESSION: 



1. No pulmonary embolus.



2. Cardiomegaly with coronary artery atherosclerosis. Minimal

interstitial opacities and reflux of contrast into the hepatic

veins. These findings could be seen with right heart dysfunction

and mild interstitial pulmonary edema.



3. Small bilateral pleural effusions and bibasilar compressive

atelectasis.



4. Prior median sternotomy with mild separation of the sternotomy

fragments measuring 1.9 cm with increased number of sternotomy

wire fractures. No definite soft tissue abnormalities along the

median sternotomy.



5. Findings suggest chronic pancreatitis.



6. Mild mediastinal lymphadenopathy. This may be reactive or

congestive. If the patient has history of neoplasm uma

metastatic disease could produce a similar appearance.



This exam was performed according to our departmental

dose-optimization program, which includes automated exposure

control, adjustment of the mA and/or kV according to patient size

and/or use of iterative reconstruction technique.

## 2020-03-08 NOTE — RADIOLOGY REPORT (SQ)
EXAM DESCRIPTION: 



XR CHEST 1 VIEW



COMPLETED DATE/TME:  03/08/2020 00:28



CLINICAL HISTORY: 



65 years, Male, sob



COMPARISON:

12/13/2018 chest



NUMBER OF VIEWS:

1



TECHNIQUE:

Portable chest



LIMITATIONS:

None.



FINDINGS:



Cardiomegaly. Median sternotomy wires. Left-sided pacing device.

Chronic appearing interstitial changes. No confluent airspace

opacity. No pneumothorax



IMPRESSION:



Cardiomegaly. No acute cardiopulmonary process

 



copyright 2011 [employer]- All Rights Reserved

## 2020-03-08 NOTE — EKG REPORT
SEVERITY:- ABNORMAL ECG -

WIDE COMPLEX TACHYCARDIA

IVCD, CONSIDER ATYPICAL LBBB

INFERIOR INFARCT, ACUTE

:

Confirmed by: Cooper Lin MD 08-Mar-2020 09:32:39

## 2020-03-08 NOTE — PDOC H&P
History of Present Illness


Admission Date/PCP: 


  





  JOHN OBANDO MD





Patient complains of: Patient presents emergency room with complaints of 

difficulty breathing, dizziness, and difficulty with his urination.  He became 

short of breath and so came to the ER for further evaluation.


History of Present Illness: 


DEVORAH QUIROZ SR is a 65 year old male


In the ER patient was found to be tachycardic, tachypneic as well as hypotensive

but denies any chest pain.  Further evaluation reveals a urinary tract 

infection.  Patient denies any chest pain.  He does have a significant cardiac 

history including coronary artery disease and a recent ablation procedure at [facility]

on February 11 of this year.  He says he is scheduled for another ablation at 

the end of this month.  He has a pacemaker as well as defibrillator.


CT scan of the chest done reveals cardiomegaly with coronary artery 

atherosclerosis





Past Medical History


Cardiac Medical History: Reports: Congestive Heart Failure, Coronary Artery 

Disease, Myocardial Infarction - x 8


Endocrine Medical History: 


   Denies: Diabetes Mellitus Type 1, Diabetes Mellitus Type 2





Past Surgical History


Past Surgical History: Reports: Cardiac Catheterization - bipass x4, 

Cholecystectomy - PACEMAKER/DEFIB, Other - Defibrillator/pacemaker.





Social History


Information Source: Patient


Lives with: Family


Smoking Status: Former Smoker


Electronic Cigarette use?: No


Frequency of Alcohol Use: Social


Hx Recreational Drug Use: No





- Advance Directive


Resuscitation Status: Full Code





Family History


Family History: Reviewed & Not Pertinent


Parental Family History Reviewed: No


Children Family History Reviewed: Yes


Sibling(s) Family History Reviewed.: Yes





Medication/Allergy


Home Medications: 








Aspirin [Ecotrin] 81 mg PO DAILY 11/20/18 


Aripiprazole [Abilify] 5 mg PO DAILY 12/13/18 


Furosemide [Lasix] 5 mg PO DAILY 12/13/18 


Indomethacin [Indocin 25 mg Capsule] 25 mg PO PRN PRN 12/13/18 


Melatonin [Melatonin 3 mg Tablet] 3 mg PO QHS 12/13/18 


Tadalafil [Cialis] 10 mg PO DAILY 12/13/18 








Allergies/Adverse Reactions: 


                                        





haloperidol [From Haldol] Allergy (Verified 12/13/18 15:56)


   


adhesive tape Adverse Reaction (Verified 12/13/18 15:56)


   











Review of Systems


All systems: reviewed and no additional remarkable complaints except as stated


Cardiovascular: PRESENT: dyspnea on exertion, orthropnea.  ABSENT: chest pain, 

edema, palpitations


Respiratory: PRESENT: dyspnea.  ABSENT: cough, hemoptysis


Genitourinary: PRESENT: difficulty urinating, dysuria.  ABSENT: hematuria


Musculoskeletal: ABSENT: back pain


Neurological: PRESENT: dizziness





Physical Exam


Vital Signs: 


                                        











Temp Pulse Resp BP Pulse Ox


 


 97.8 F      24 H  115/84   97 


 


 03/08/20 07:23     03/08/20 07:00  03/08/20 07:01  03/08/20 07:01








                                 Intake & Output











 03/07/20 03/08/20 03/09/20





 05:59 06:59 06:59


 


Output Total   400


 


Balance   -400


 


Weight   











General appearance: PRESENT: no acute distress, obese, well-nourished


Head exam: PRESENT: atraumatic, normocephalic


Eye exam: PRESENT: conjunctiva pink, PERRLA.  ABSENT: scleral icterus


Mouth exam: PRESENT: moist, tongue midline


Neck exam: ABSENT: carotid bruit, JVD, lymphadenopathy, thyromegaly


Respiratory exam: PRESENT: clear to auscultation dimitry, tachypnea, unlabored.  

ABSENT: rales, rhonchi, wheezes


Cardiovascular exam: PRESENT: +S1, +S2, tachycardia.  ABSENT: diastolic murmur, 

rubs, systolic murmur


Pulses: PRESENT: normal dorsalis pedis pul


Vascular exam: PRESENT: normal capillary refill


GI/Abdominal exam: PRESENT: normal bowel sounds, soft.  ABSENT: distended, 

guarding, mass, organolmegaly, rebound, tenderness


Rectal exam: PRESENT: deferred


Extremities exam: PRESENT: full ROM.  ABSENT: calf tenderness, clubbing, pedal 

edema


Neurological exam: PRESENT: alert, awake, oriented to person, oriented to place,

oriented to time, oriented to situation, CN II-XII grossly intact.  ABSENT: 

motor sensory deficit


Psychiatric exam: PRESENT: appropriate affect, normal mood.  ABSENT: homicidal 

ideation, suicidal ideation


Skin exam: PRESENT: dry, intact, warm.  ABSENT: cyanosis, rash





Results


Laboratory Results: 


                                        





                                 03/08/20 00:15 





                                 03/08/20 00:15 





                                        











  03/08/20 03/08/20 03/08/20





  00:15 00:15 00:15


 


WBC  11.6 H  


 


RBC  4.92  


 


Hgb  15.4  


 


Hct  45.6  


 


MCV  93  


 


MCH  31.3  


 


MCHC  33.8  


 


RDW  16.2 H  


 


Plt Count  166  


 


Seg Neutrophils %  77.9  


 


VBG pH   


 


VBG pCO2   


 


VBG HCO3   


 


VBG Base Excess   


 


Sodium   133.0 L 


 


Potassium   5.2 H 


 


Chloride   100 


 


Carbon Dioxide   23 


 


Anion Gap   10 


 


BUN   19 


 


Creatinine   1.50 H 


 


Est GFR ( Amer)   57 L 


 


Glucose   134 H 


 


Lactic Acid    1.4


 


Calcium   9.0 


 


Total Bilirubin   0.9 


 


AST   28 


 


Alkaline Phosphatase   154 H 


 


Total Protein   6.8 


 


Albumin   3.6 


 


Urine Color   


 


Urine Appearance   


 


Urine pH   


 


Ur Specific Gravity   


 


Urine Protein   


 


Urine Glucose (UA)   


 


Urine Ketones   


 


Urine Blood   


 


Urine Nitrite   


 


Ur Leukocyte Esterase   


 


Urine WBC (Auto)   


 


Urine RBC (Auto)   














  03/08/20 03/08/20





  01:05 03:10


 


WBC  


 


RBC  


 


Hgb  


 


Hct  


 


MCV  


 


MCH  


 


MCHC  


 


RDW  


 


Plt Count  


 


Seg Neutrophils %  


 


VBG pH   7.45 H


 


VBG pCO2   26.9 L


 


VBG HCO3   18.2 L


 


VBG Base Excess   -4.3


 


Sodium  


 


Potassium  


 


Chloride  


 


Carbon Dioxide  


 


Anion Gap  


 


BUN  


 


Creatinine  


 


Est GFR (African Amer)  


 


Glucose  


 


Lactic Acid  


 


Calcium  


 


Total Bilirubin  


 


AST  


 


Alkaline Phosphatase  


 


Total Protein  


 


Albumin  


 


Urine Color  DARK YELLOW 


 


Urine Appearance  CLOUDY 


 


Urine pH  5.0 


 


Ur Specific Gravity  1.015 


 


Urine Protein  100 H 


 


Urine Glucose (UA)  NEGATIVE 


 


Urine Ketones  NEGATIVE 


 


Urine Blood  SMALL H 


 


Urine Nitrite  NEGATIVE 


 


Ur Leukocyte Esterase  LARGE H 


 


Urine WBC (Auto)  >182 


 


Urine RBC (Auto)  11 








                                        











  03/08/20 03/08/20





  00:15 00:15


 


Troponin I  < 0.012 


 


NT-Pro-B Natriuret Pep   5400 H











EKG Comments: 





Paced Rhythm


Impressions: 


                                        





Chest X-Ray  03/08/20 00:28


IMPRESSION:


 


Cardiomegaly. No acute cardiopulmonary process


 


 


copyright 2011 [employer]- All Rights Reserved


 








Chest/Abdomen CTA  03/08/20 01:53


IMPRESSION: 


 


1. No pulmonary embolus.


 


2. Cardiomegaly with coronary artery atherosclerosis. Minimal


interstitial opacities and reflux of contrast into the hepatic


veins. These findings could be seen with right heart dysfunction


and mild interstitial pulmonary edema.


 


3. Small bilateral pleural effusions and bibasilar compressive


atelectasis.


 


4. Prior median sternotomy with mild separation of the sternotomy


fragments measuring 1.9 cm with increased number of sternotomy


wire fractures. No definite soft tissue abnormalities along the


median sternotomy.


 


5. Findings suggest chronic pancreatitis.


 


6. Mild mediastinal lymphadenopathy. This may be reactive or


congestive. If the patient has history of neoplasm uma


metastatic disease could produce a similar appearance.


 


This exam was performed according to our departmental


dose-optimization program, which includes automated exposure


control, adjustment of the mA and/or kV according to patient size


and/or use of iterative reconstruction technique.


 














Assessment and Plan





- Diagnosis


(1) Sepsis


Qualifiers: 


   Sepsis type: sepsis due to unspecified organism   Sepsis acute organ 

dysfunction status: without acute organ dysfunction   Qualified Code(s): A41.9 -

Sepsis, unspecified organism   


Is this a current diagnosis for this admission?: Yes   


Plan: 


Patient presents with tachycardia, tachypnea, as well as source of infection of 

UTI.  He has been afebrile and I believe he was mildly hypotensive on initial 

presentation.  His kidney function is also compromised with a creatinine of 1.57

however the last one we have on file which was 0.8 was in 2018.  His recent 

baseline is unclear








(2) CAD (coronary artery disease)


Qualifiers: 


   Coronary Disease-Associated Artery/Lesion type: native artery   Associated 

angina: without angina 


Is this a current diagnosis for this admission?: Yes   


Plan: 


Patient has underlying CAD however at this time he denies any chest pain or any 

other anginal equivalent symptoms.  He is tachycardic but this likely is from 

his acute infection.  Patient will be monitored on telemetry








(3) AICD (automatic cardioverter/defibrillator) present


Is this a current diagnosis for this admission?: Yes   





(4) UTI (urinary tract infection)


Qualifiers: 


   Urinary tract infection type: site unspecified   Hematuria presence: without 

hematuria   Qualified Code(s): N39.0 - Urinary tract infection, site not specif

ied   


Is this a current diagnosis for this admission?: Yes   


Plan: 


We will continue with ceftriaxone and de-escalate or adjust according to the cul

ture results








- Plan Summary


Summary: 


Patient is a full code as per my discussions with him





- Time


Time Spent with patient: 25-34 minutes


Medications reviewed and adjusted accordingly: Yes


Anticipated discharge: Home


Within: within 72 hours





- Inpatient Certification


Based on my medical assessment, after consideration of the patient's 

comorbidities, presenting symptoms, or acuity I expect that the services needed 

warrant INPATIENT care.: Yes


Medical Necessity: Need Close Monitoring Due to Risk of Patient Decompensation, 

Need for IV Antibiotics

## 2020-03-08 NOTE — PROGRESS NOTE
Provider Note


Provider Note: 





Patient daughter was very upset that patient was being transferred to [facility].  She 

physically will not allow me to explain all to her was going on.  She was 

yelling on the phone saying that they were told by the ED physician that patient

had fluid around her heart and this was not being addressed.  I tried to explain

to her the rationale and the reason for transferring Mr. fry it however 

patient daughter will not listening.  She said she will have his wife call me 

which she did.  I did manage to explain to the wife why patient was being 

transferred.  I explained about the potentially fatal arrhythmia, the pulse 

generator of the AICD that needs to be changed and the fact that patient was in 

V. tach which was difficult to shock him out of when he presented to the ED.  

She voiced understanding after I explained to her and she has agreed with the 

transfer.  Patient himself is awake alert oriented x3 and able to make his own 

decisions.  He consented to the transfer and the ED charge nurse as well as the 

RN went back to talk to him and he agrees to consent to being transferred to 

[facility].  Patient will be transferred as per previous plan.

## 2020-04-01 NOTE — PDOC DISCHARGE SUMMARY
Impression





- Admit/DC Date/PCP


Admission Date/Primary Care Provider: 


  03/08/20 08:57





  JOHN OBANDO MD





Discharge Date: 03/08/20





- Discharge Diagnosis


(1) Sepsis


Is this a current diagnosis for this admission?: Yes   





(2) CAD (coronary artery disease)


Is this a current diagnosis for this admission?: Yes   





(3) AICD (automatic cardioverter/defibrillator) present


Is this a current diagnosis for this admission?: Yes   





(4) UTI (urinary tract infection)


Is this a current diagnosis for this admission?: Yes   





(5) Ventricular tachycardia


Is this a current diagnosis for this admission?: Yes   





- Assessment


Summary: 


Patient is a full code as per my discussions with him





- Additional Information


Resuscitation Status: Full Code


Discharge Diet: Cardiac


Discharge Activity: Bedrest


Referrals: 


JOHN OBANDO MD [Primary Care Provider] - Follow up as needed


Home Medications: 








Aspirin [Ecotrin] 81 mg PO DAILY 11/20/18 


Aripiprazole [Abilify] 5 mg PO DAILY 12/13/18 


Furosemide [Lasix] 5 mg PO DAILY 12/13/18 


Indomethacin [Indocin 25 mg Capsule] 25 mg PO PRN PRN 12/13/18 


Melatonin [Melatonin 3 mg Tablet] 3 mg PO QHS 12/13/18 


Tadalafil [Cialis] 10 mg PO DAILY 12/13/18 











History of Present Illiness


History of Present Illness: 


DEVORAH QUIROZ SR is a 65 year old male


In the ER patient was found to be tachycardic, tachypneic as well as hypotensive

 but denies any chest pain.  Further evaluation reveals a urinary tract 

infection.  Patient denies any chest pain.  He does have a significant cardiac 

history including coronary artery disease and a recent ablation procedure at [facility]

 on February 11 of this year.  He says he is scheduled for another ablation at 

the end of this month.  He has a pacemaker as well as defibrillator.


CT scan of the chest done reveals cardiomegaly with coronary artery 

atherosclerosis





Hospital Course


Hospital Course: 


Patient was seen in the emergency room earlier today with UTI and sepsis.  He 

was noted to have wide-complex tachycardia on EKG.  As such a cardiology consult

 was obtained.  Please refer to cardiology consultation for full details.  After

 evaluation by cardiology patient was confirmed to be in ventricular 

tachycardia.  He just had an ablation done on February 11 and he was supposed to

 report back at the end of March for ICD pulse generator change out.  His ICD 

was interrogated and patient was found to have an underlying ventricular 

tachycardia which was ultimately paced terminated by the cardiologist.  Dr. Treviño performed a programmed electrical stimulation with burst pacing.  Due to 

this patient's complex medical issue and need for device pulse generator change 

it is felt that patient should be transferred to a tertiary care center, 

preferably [facility] can be performed in a more urgent fashion given his presentation 

with ventricular tachycardia.  Both Dr. Treviño and I spoke with the cardiologist

 fellow at [facility].  They have accepted this patient although he will be going to 

the ED from where he will be evaluated by cardiology.  I talked to Dr Tripathi, the

 cardiology fellow.  Dr. Carmen Dickinson will be the accepting physician


Patient had monomorphic ventricular tachycardia status post termination by 

programmed external stimulation.  He was also started on intravenous amiodarone 

bolus and maintenance and will be started on mexiletine as suggested by [facility] 

cardiology





Physical Exam


Vital Signs: 


                                        











Temp Pulse Resp BP Pulse Ox


 


 98 F      17   114/84   96 


 


 03/08/20 11:00     03/08/20 13:01  03/08/20 13:01  03/08/20 13:01








                                 Intake & Output











 03/07/20 03/08/20 03/09/20





 05:59 06:59 06:59


 


Output Total   400


 


Balance   -400


 


Weight   











General appearance: PRESENT: no acute distress, cooperative


Head exam: PRESENT: atraumatic


Neck exam: PRESENT: full ROM.  ABSENT: JVD, tenderness


Respiratory exam: PRESENT: clear to auscultation dimitry, tachypnea


Cardiovascular exam: PRESENT: RRR, +S1, +S2


GI/Abdominal exam: PRESENT: normal bowel sounds


Rectal exam: PRESENT: deferred


Neurological exam: PRESENT: alert, awake, oriented to person, oriented to place,

 oriented to time, oriented to situation


Psychiatric exam: PRESENT: appropriate affect





Results


Laboratory Results: 


                                        











WBC  11.6 10^3/uL (4.0-10.5)  H  03/08/20  00:15    


 


RBC  4.92 10^6/uL (4.35-5.55)   03/08/20  00:15    


 


Hgb  15.4 g/dL (13.5-17.0)   03/08/20  00:15    


 


Hct  45.6 % (37.9-51.0)   03/08/20  00:15    


 


MCV  93 fl (80-97)   03/08/20  00:15    


 


MCH  31.3 pg (27.0-33.4)   03/08/20  00:15    


 


MCHC  33.8 g/dL (32.0-36.0)   03/08/20  00:15    


 


RDW  16.2 % (11.5-14.0)  H  03/08/20  00:15    


 


Plt Count  166 10^3/uL (150-450)   03/08/20  00:15    


 


Lymph % (Auto)  16.5 % (13-45)   03/08/20  00:15    


 


Mono % (Auto)  4.9 % (3-13)   03/08/20  00:15    


 


Eos % (Auto)  0.4 % (0-6)   03/08/20  00:15    


 


Baso % (Auto)  0.3 % (0-2)   03/08/20  00:15    


 


Absolute Neuts (auto)  9.1 10^3/uL (1.7-8.2)  H  03/08/20  00:15    


 


Absolute Lymphs (auto)  1.9 10^3/uL (0.5-4.7)   03/08/20  00:15    


 


Absolute Monos (auto)  0.6 10^3/uL (0.1-1.4)   03/08/20  00:15    


 


Absolute Eos (auto)  0.0 10^3/uL (0.0-0.6)   03/08/20  00:15    


 


Absolute Basos (auto)  0.0 10^3/uL (0.0-0.2)   03/08/20  00:15    


 


Seg Neutrophils %  77.9 % (42-78)   03/08/20  00:15    


 


D-Dimer  1.20 ug/mL (0.00-0.50)  H  03/08/20  00:15    


 


VBG pH  7.45  (7.30-7.42)  H  03/08/20  03:10    


 


VBG pCO2  26.9 mmHg (35-63)  L  03/08/20  03:10    


 


VBG HCO3  18.2 mmol/L (20-32)  L  03/08/20  03:10    


 


VBG Base Excess  -4.3 mmol/L  03/08/20  03:10    


 


Sodium  133.0 mmol/L (137-145)  L  03/08/20  00:15    


 


Potassium  5.2 mmol/L (3.6-5.0)  H  03/08/20  00:15    


 


Chloride  100 mmol/L ()   03/08/20  00:15    


 


Carbon Dioxide  23 mmol/L (22-30)   03/08/20  00:15    


 


Anion Gap  10  (5-19)   03/08/20  00:15    


 


BUN  19 mg/dL (7-20)   03/08/20  00:15    


 


Creatinine  1.50 mg/dL (0.52-1.25)  H  03/08/20  00:15    


 


Est GFR ( Amer)  57  (>60)  L  03/08/20  00:15    


 


Est GFR (MDRD) Non-Af  47  (>60)  L  03/08/20  00:15    


 


Glucose  134 mg/dL ()  H  03/08/20  00:15    


 


Lactic Acid  1.4 mmol/L (0.7-2.1)   03/08/20  00:15    


 


Calcium  9.0 mg/dL (8.4-10.2)   03/08/20  00:15    


 


Total Bilirubin  0.9 mg/dL (0.2-1.3)   03/08/20  00:15    


 


Direct Bilirubin  0.2 mg/dL (0.0-0.4)   03/08/20  00:15    


 


Neonat Total Bilirubin  Not Reportable   03/08/20  00:15    


 


Neonat Direct Bilirubin  Not Reportable   03/08/20  00:15    


 


Neonat Indirect Bili  Not Reportable   03/08/20  00:15    


 


AST  28 U/L (17-59)   03/08/20  00:15    


 


ALT  40 U/L (<50)   03/08/20  00:15    


 


Alkaline Phosphatase  154 U/L ()  H  03/08/20  00:15    


 


Troponin I  < 0.012 ng/mL  03/08/20  09:53    


 


NT-Pro-B Natriuret Pep  5400 pg/mL (<125)  H  03/08/20  00:15    


 


Total Protein  6.8 g/dL (6.3-8.2)   03/08/20  00:15    


 


Albumin  3.6 g/dL (3.5-5.0)   03/08/20  00:15    


 


Urine Color  DARK YELLOW   03/08/20  01:05    


 


Urine Appearance  CLOUDY   03/08/20  01:05    


 


Urine pH  5.0  (5.0-9.0)   03/08/20  01:05    


 


Ur Specific Gravity  1.015   03/08/20  01:05    


 


Urine Protein  100 mg/dL (NEGATIVE)  H  03/08/20  01:05    


 


Urine Glucose (UA)  NEGATIVE mg/dL (NEGATIVE)   03/08/20  01:05    


 


Urine Ketones  NEGATIVE mg/dL (NEGATIVE)   03/08/20  01:05    


 


Urine Blood  SMALL  (NEGATIVE)  H  03/08/20  01:05    


 


Urine Nitrite  NEGATIVE  (NEGATIVE)   03/08/20  01:05    


 


Urine Bilirubin  NEGATIVE  (NEGATIVE)   03/08/20  01:05    


 


Urine Urobilinogen  NEGATIVE mg/dL (<2.0)   03/08/20  01:05    


 


Ur Leukocyte Esterase  LARGE  (NEGATIVE)  H  03/08/20  01:05    


 


Urine WBC (Auto)  >182 /HPF  03/08/20  01:05    


 


Urine RBC (Auto)  11 /HPF  03/08/20  01:05    


 


Urine WBC Clumps  MANY /HPF  03/08/20  01:05    


 


U Non-Squamous Epis Auto  2 /HPF  03/08/20  01:05    


 


Urine Mucus (Auto)  RARE /LPF  03/08/20  01:05    


 


Urine Ascorbic Acid  NEGATIVE  (NEGATIVE)   03/08/20  01:05    








                                        











  03/08/20 03/08/20 03/08/20





  00:15 00:15 09:53


 


Troponin I  < 0.012   < 0.012


 


NT-Pro-B Natriuret Pep   5400 H 











EKG Comments: 





Wide complex tachycardia


Impressions: 


                                        





Chest X-Ray  03/08/20 00:28


IMPRESSION:


 


Cardiomegaly. No acute cardiopulmonary process


 


 


copyright 2011 [employer]- All Rights Reserved


 








Chest/Abdomen CTA  03/08/20 01:53


IMPRESSION: 


 


1. No pulmonary embolus.


 


2. Cardiomegaly with coronary artery atherosclerosis. Minimal


interstitial opacities and reflux of contrast into the hepatic


veins. These findings could be seen with right heart dysfunction


and mild interstitial pulmonary edema.


 


3. Small bilateral pleural effusions and bibasilar compressive


atelectasis.


 


4. Prior median sternotomy with mild separation of the sternotomy


fragments measuring 1.9 cm with increased number of sternotomy


wire fractures. No definite soft tissue abnormalities along the


median sternotomy.


 


5. Findings suggest chronic pancreatitis.


 


6. Mild mediastinal lymphadenopathy. This may be reactive or


congestive. If the patient has history of neoplasm uma


metastatic disease could produce a similar appearance.


 


This exam was performed according to our departmental


dose-optimization program, which includes automated exposure


control, adjustment of the mA and/or kV according to patient size


and/or use of iterative reconstruction technique.


 














Plan


Health Concerns: 


Spoke to [facility] for further evaluation and management


Time Spent: Greater than 30 Minutes





Stroke


Is this a Stroke Patient?: No





Acute Heart Failure





- **


Is this a Heart Failure Patient?: No
Review of Systems  Constitutional:  (+) fever, chills.  Eyes:  No visual changes, eye pain, or discharge.  ENMT:  No hearing changes, pain, or discharge. No nasal congestion, discharge, or bleeding. No throat pain, swelling, or difficulty swallowing.  Cardiac:  No chest pain, palpitations, syncope, or edema.  Respiratory:  (+) SOB, cough. No hemoptysis.  GI:  No nausea, vomiting, diarrhea, or abdominal pain.   :  No dysuria, hematuria, frequency, or burning.   MS:  No back pain.  Skin:  No skin rash, pruritis, jaundice, or lesions.  Neuro:  No headache, dizziness, loss of sensation, or focal weakness.  No change in mental status.   Endocrine: No history of thyroid disease or diabetes.
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
## 2022-01-06 NOTE — PDOC CONSULTATION
Consultation


Consult Date: 03/08/20


Attending physician:: STIVEN TODD


Provider Consulted: RAMSES FRIEND


Consult reason:: Wide-complex tachycardia





History of Present Illness


Admission Date/PCP: 


  03/08/20 08:57





  JOHN OBANDO MD





Patient complains of: Dyspnea


History of Present Illness: 


DEVORAH QUIROZ SR is a 65 year old male


With the following active problems


1.  Coronary artery disease


2.  Dilated ischemic cardiomyopathy


3.  CABG


4.  Left-sided dual-chamber [employer] ICD


5.  Ventricular tachycardia status post VT ablation-[facility] 2/11/2020


6.  Systemic hypertension


8.  Dyslipidemia


9.  Congestive heart failure-systolic





64-year-old male with problems as chronicled above presented to the emergency 

room with dyspnea and also complains of dysuria.  His admitting diagnosis with 

UTI with sepsis.  I was asked to evaluate the patient due to the presence of 

wide-complex tachycardia noted on EKG.  Patient symptomatically felt different 

in the last 24 hours or so.  Although it is hard to articulate his symptoms ex

actly patient mentions dyspnea which is become worse although he attributes the 

majority of his symptoms to dysuria.





Recent admission to [facility] for ventricular tachycardia ablation on 

2/11/2020.  At that time he was asked to report back to the hospital at the end 

of March for ICD pulse generator change out.  Subsequent follow-up was also 

planned.





No familial illnesses reported.





Surgical history includes coronary artery bypass graft as well as dual-chamber 

ICD implantation which predates bypass surgery.








Past Medical History


Cardiac Medical History: Reports: Congestive Heart Failure, Coronary Artery 

Disease, Myocardial Infarction - x 8


Endocrine Medical History: 


   Denies: Diabetes Mellitus Type 1, Diabetes Mellitus Type 2





Past Surgical History


Past Surgical History: Reports: Cardiac Catheterization - bipass x4, 

Cholecystectomy - PACEMAKER/DEFIB, Other - Defibrillator/pacemaker.





Social History


Lives with: Family


Smoking Status: Former Smoker


Electronic Cigarette use?: No


Frequency of Alcohol Use: Social


Hx Recreational Drug Use: No





- Advance Directive


Resuscitation Status: Full Code





Family History


Family History: Reviewed & Not Pertinent


Parental Family History Reviewed: Yes - No familial illnesses reported to me


Children Family History Reviewed: NA


Sibling(s) Family History Reviewed.: NA





Medication/Allergy


Home Medications: 








Aspirin [Ecotrin] 81 mg PO DAILY 11/20/18 


Aripiprazole [Abilify] 5 mg PO DAILY 12/13/18 


Furosemide [Lasix] 5 mg PO DAILY 12/13/18 


Indomethacin [Indocin 25 mg Capsule] 25 mg PO PRN PRN 12/13/18 


Melatonin [Melatonin 3 mg Tablet] 3 mg PO QHS 12/13/18 


Tadalafil [Cialis] 10 mg PO DAILY 12/13/18 








Allergies/Adverse Reactions: 


                                        





haloperidol [From Haldol] Allergy (Verified 12/13/18 15:56)


   


adhesive tape Adverse Reaction (Verified 12/13/18 15:56)


   











Review of Systems


Constitutional: PRESENT: as per HPI


Eyes: PRESENT: as per HPI


Ears: PRESENT: as per HPI


Cardiovascular: PRESENT: dyspnea on exertion, orthropnea


Gastrointestinal: ABSENT: as per HPI, abdominal pain, bloating, coffee ground 

emesis, constipation, diarrhea, dysphagia, heartburn, hematemesis, hematochezia,

melena, nausea, vomiting, other


Genitourinary: PRESENT: dysuria





Physical Exam


Vital Signs: 


                                        











Temp Pulse Resp BP Pulse Ox


 


 97.8 F      20   118/71   96 


 


 03/08/20 07:23     03/08/20 11:31  03/08/20 11:31  03/08/20 10:01








                                 Intake & Output











 03/07/20 03/08/20 03/09/20





 05:59 06:59 06:59


 


Output Total   400


 


Balance   -400


 


Weight   











General appearance: PRESENT: cooperative, mild distress, obese, well-developed


Head exam: PRESENT: atraumatic, normocephalic


Eye exam: PRESENT: conjunctiva pink, EOMI


Teeth exam: PRESENT: poor dentation


Respiratory exam: PRESENT: crackles, decreased breath sounds, symmetrical, 

tachypnea


Cardiovascular exam: PRESENT: RRR, +S1, +S2, other - Left chest wall ICD implant

site without edema, erythema or excoriation.  Healed sternotomy scar noted.


Pulses: PRESENT: normal radial pulses


GI/Abdominal exam: PRESENT: soft


Rectal exam: PRESENT: deferred


Neurological exam: PRESENT: alert, awake, oriented to person, oriented to place,

oriented to time, oriented to situation


Psychiatric exam: PRESENT: appropriate affect


Skin exam: PRESENT: dry, intact, normal color





Results


Laboratory Results: 


                                        





                                 03/08/20 00:15 





                                 03/08/20 00:15 





                                        











  03/08/20 03/08/20 03/08/20





  00:15 00:15 00:15


 


WBC  11.6 H  


 


RBC  4.92  


 


Hgb  15.4  


 


Hct  45.6  


 


MCV  93  


 


MCH  31.3  


 


MCHC  33.8  


 


RDW  16.2 H  


 


Plt Count  166  


 


Seg Neutrophils %  77.9  


 


VBG pH   


 


VBG pCO2   


 


VBG HCO3   


 


VBG Base Excess   


 


Sodium   133.0 L 


 


Potassium   5.2 H 


 


Chloride   100 


 


Carbon Dioxide   23 


 


Anion Gap   10 


 


BUN   19 


 


Creatinine   1.50 H 


 


Est GFR ( Amer)   57 L 


 


Glucose   134 H 


 


Lactic Acid    1.4


 


Calcium   9.0 


 


Total Bilirubin   0.9 


 


AST   28 


 


Alkaline Phosphatase   154 H 


 


Total Protein   6.8 


 


Albumin   3.6 


 


Urine Color   


 


Urine Appearance   


 


Urine pH   


 


Ur Specific Gravity   


 


Urine Protein   


 


Urine Glucose (UA)   


 


Urine Ketones   


 


Urine Blood   


 


Urine Nitrite   


 


Ur Leukocyte Esterase   


 


Urine WBC (Auto)   


 


Urine RBC (Auto)   














  03/08/20 03/08/20





  01:05 03:10


 


WBC  


 


RBC  


 


Hgb  


 


Hct  


 


MCV  


 


MCH  


 


MCHC  


 


RDW  


 


Plt Count  


 


Seg Neutrophils %  


 


VBG pH   7.45 H


 


VBG pCO2   26.9 L


 


VBG HCO3   18.2 L


 


VBG Base Excess   -4.3


 


Sodium  


 


Potassium  


 


Chloride  


 


Carbon Dioxide  


 


Anion Gap  


 


BUN  


 


Creatinine  


 


Est GFR (African Amer)  


 


Glucose  


 


Lactic Acid  


 


Calcium  


 


Total Bilirubin  


 


AST  


 


Alkaline Phosphatase  


 


Total Protein  


 


Albumin  


 


Urine Color  DARK YELLOW 


 


Urine Appearance  CLOUDY 


 


Urine pH  5.0 


 


Ur Specific Gravity  1.015 


 


Urine Protein  100 H 


 


Urine Glucose (UA)  NEGATIVE 


 


Urine Ketones  NEGATIVE 


 


Urine Blood  SMALL H 


 


Urine Nitrite  NEGATIVE 


 


Ur Leukocyte Esterase  LARGE H 


 


Urine WBC (Auto)  >182 


 


Urine RBC (Auto)  11 








                                        











  03/08/20 03/08/20 03/08/20





  00:15 00:15 09:53


 


Troponin I  < 0.012   < 0.012


 


NT-Pro-B Natriuret Pep   5400 H 











EKG Comments: 





Twelve-lead EKG 3/8/2020 095 4 AM


Wide-complex tachycardia likely ventricular tachycardia 111 bpm.  Left bundle 

left axis





Twelve-lead EKG 3/8/2020 11:26 AM


Sinus rhythm, 75 bpm, nonspecific IVCD, QTC is 505 ms





[employer] dual-chamber ICD interrogation at presentation showed sinus 

rhythm with underlying ventricular tachycardia with a cycle length of 518 to 520

ms.





Ventricular tachycardia was pace terminated with 350 ms burst 25 pulses.  Initi

al attempts to burst terminate at about 450 ms were unsuccessful.


Impressions: 


                                        





Chest X-Ray  03/08/20 00:28


IMPRESSION:


 


Cardiomegaly. No acute cardiopulmonary process


 


 


copyright 2011 [employer]- All Rights Reserved


 








Chest/Abdomen CTA  03/08/20 01:53


IMPRESSION: 


 


1. No pulmonary embolus.


 


2. Cardiomegaly with coronary artery atherosclerosis. Minimal


interstitial opacities and reflux of contrast into the hepatic


veins. These findings could be seen with right heart dysfunction


and mild interstitial pulmonary edema.


 


3. Small bilateral pleural effusions and bibasilar compressive


atelectasis.


 


4. Prior median sternotomy with mild separation of the sternotomy


fragments measuring 1.9 cm with increased number of sternotomy


wire fractures. No definite soft tissue abnormalities along the


median sternotomy.


 


5. Findings suggest chronic pancreatitis.


 


6. Mild mediastinal lymphadenopathy. This may be reactive or


congestive. If the patient has history of neoplasm uma


metastatic disease could produce a similar appearance.


 


This exam was performed according to our departmental


dose-optimization program, which includes automated exposure


control, adjustment of the mA and/or kV according to patient size


and/or use of iterative reconstruction technique.


 














Assessment & Plan





- Diagnosis


(1) Ventricular tachycardia


Is this a current diagnosis for this admission?: Yes   


Plan: 


Monomorphic ventricular tachycardia at a cycle length of 520 ms


Hemodynamically stable and mentating well.





Bedside programmed electrical stimulation was performed with burst pacing 

initially at 450 ms which was unsuccessful after multiple attempts.


Subsequently burst pacing at 350 ms for about 25 pulses resulted in termination 

of ventricular tachycardia with resumption of sinus rhythm.





Patient tolerated this well.





Intravenous amiodarone bolus and maintenance infusion was started due to present

ation with ventricular tachycardia





Patient is recently status post ventricular tachycardia ablation


We will initiate mexiletine 200 mg 3 times daily in addition to oral amiodarone 

maintenance dose





Given the fact that patient ICD has tripped SUNI on 2/16/2020 he should be 

evaluated for urgent pulse generator change out given ongoing ventricular 

arrhythmia episodes.








(2) AICD (automatic cardioverter/defibrillator) present


Is this a current diagnosis for this admission?: Yes   


Plan: 


Device with normal function.


Slow ventricular tachycardia.  Programmed electrical stimulation was performed 

with burst pacing in the ventricle at 350 ms which resulted in termination of 

ventricular tachycardia





No programming changes made to the device due to the slow nature of the 

ventricular tachycardia.





Escalation of medical therapy with inclusion of mexiletine and oral amiodarone 

as well as initiation of intravenous amiodarone infusion for the next 24 hours 

or more to ensure electrical stability





Given the nature of the presenting problem and with planned outpatient follow-up

to evaluate the patient for device pulse generator change out it would be 

reasonable to transfer him to the original institution where pulse energy change

out can be performed in a more urgent fashion given his presentation with 

ventricular tachycardia.  I have spoken with the admitting physician and also 

with the referring hospital [facility] to facilitate this.


The patient also understands and acknowledges the above plan with the need to 

proceed with positive change out.  He is going to need continued observation for

electrical stability while continuing his intravenous amiodarone load.








(3) CAD (coronary artery disease)


Qualifiers: 


   Coronary Disease-Associated Artery/Lesion type: native artery   Associated 

angina: without angina 


Is this a current diagnosis for this admission?: Yes   


Plan: 


Status post CABG


No ischemic symptoms on today's visit


Continue guideline directed therapy for coronary artery disease


Beta-blockade is absolutely essential











- Notes


Notes: 





Monomorphic ventricular tachycardia status post termination by programmed 

external stimulation


Initiation of intravenous amiodarone bolus and maintenance


Anticipate transfer to tertiary facility for urgent also need to change out 

given the fact the device is tripped SUNI on 2/16/2020 details… detailed exam